# Patient Record
Sex: MALE | Race: OTHER | Employment: FULL TIME | URBAN - METROPOLITAN AREA
[De-identification: names, ages, dates, MRNs, and addresses within clinical notes are randomized per-mention and may not be internally consistent; named-entity substitution may affect disease eponyms.]

---

## 2021-03-24 ENCOUNTER — APPOINTMENT (OUTPATIENT)
Dept: GENERAL RADIOLOGY | Age: 46
End: 2021-03-24
Attending: EMERGENCY MEDICINE
Payer: COMMERCIAL

## 2021-03-24 ENCOUNTER — HOSPITAL ENCOUNTER (EMERGENCY)
Age: 46
Discharge: HOME | End: 2021-03-24
Attending: EMERGENCY MEDICINE
Payer: COMMERCIAL

## 2021-03-24 VITALS
DIASTOLIC BLOOD PRESSURE: 97 MMHG | OXYGEN SATURATION: 99 % | TEMPERATURE: 97.8 F | RESPIRATION RATE: 18 BRPM | HEART RATE: 54 BPM | WEIGHT: 250 LBS | BODY MASS INDEX: 35.79 KG/M2 | HEIGHT: 70 IN | SYSTOLIC BLOOD PRESSURE: 159 MMHG

## 2021-03-24 DIAGNOSIS — R07.89 ATYPICAL CHEST PAIN: ICD-10-CM

## 2021-03-24 DIAGNOSIS — R07.89 MUSCULOSKELETAL CHEST PAIN: Primary | ICD-10-CM

## 2021-03-24 DIAGNOSIS — F43.0 ACUTE REACTION TO STRESS: ICD-10-CM

## 2021-03-24 LAB
ALBUMIN SERPL-MCNC: 4.2 G/DL (ref 3.5–5)
ALBUMIN/GLOB SERPL: 1.2 {RATIO} (ref 1.1–2.2)
ALP SERPL-CCNC: 53 U/L (ref 45–117)
ALT SERPL-CCNC: 29 U/L (ref 12–78)
ANION GAP SERPL CALC-SCNC: 5 MMOL/L (ref 5–15)
AST SERPL-CCNC: 18 U/L (ref 15–37)
BASOPHILS # BLD: 0 K/UL (ref 0–0.1)
BASOPHILS NFR BLD: 0 % (ref 0–1)
BILIRUB SERPL-MCNC: 0.4 MG/DL (ref 0.2–1)
BUN SERPL-MCNC: 10 MG/DL (ref 6–20)
BUN/CREAT SERPL: 11 (ref 12–20)
CALCIUM SERPL-MCNC: 9.5 MG/DL (ref 8.5–10.1)
CHLORIDE SERPL-SCNC: 106 MMOL/L (ref 97–108)
CO2 SERPL-SCNC: 28 MMOL/L (ref 21–32)
COMMENT, HOLDF: NORMAL
CREAT SERPL-MCNC: 0.95 MG/DL (ref 0.7–1.3)
DIFFERENTIAL METHOD BLD: NORMAL
EOSINOPHIL # BLD: 0.1 K/UL (ref 0–0.4)
EOSINOPHIL NFR BLD: 1 % (ref 0–7)
ERYTHROCYTE [DISTWIDTH] IN BLOOD BY AUTOMATED COUNT: 12.4 % (ref 11.5–14.5)
GLOBULIN SER CALC-MCNC: 3.4 G/DL (ref 2–4)
GLUCOSE SERPL-MCNC: 91 MG/DL (ref 65–100)
HCT VFR BLD AUTO: 39.3 % (ref 36.6–50.3)
HGB BLD-MCNC: 13.3 G/DL (ref 12.1–17)
IMM GRANULOCYTES # BLD AUTO: 0 K/UL (ref 0–0.04)
IMM GRANULOCYTES NFR BLD AUTO: 0 % (ref 0–0.5)
LIPASE SERPL-CCNC: 104 U/L (ref 73–393)
LYMPHOCYTES # BLD: 1.7 K/UL (ref 0.8–3.5)
LYMPHOCYTES NFR BLD: 22 % (ref 12–49)
MCH RBC QN AUTO: 29.6 PG (ref 26–34)
MCHC RBC AUTO-ENTMCNC: 33.8 G/DL (ref 30–36.5)
MCV RBC AUTO: 87.5 FL (ref 80–99)
MONOCYTES # BLD: 0.4 K/UL (ref 0–1)
MONOCYTES NFR BLD: 5 % (ref 5–13)
NEUTS SEG # BLD: 5.5 K/UL (ref 1.8–8)
NEUTS SEG NFR BLD: 72 % (ref 32–75)
NRBC # BLD: 0 K/UL (ref 0–0.01)
NRBC BLD-RTO: 0 PER 100 WBC
PLATELET # BLD AUTO: 152 K/UL (ref 150–400)
PMV BLD AUTO: 10.8 FL (ref 8.9–12.9)
POTASSIUM SERPL-SCNC: 3.3 MMOL/L (ref 3.5–5.1)
PROT SERPL-MCNC: 7.6 G/DL (ref 6.4–8.2)
RBC # BLD AUTO: 4.49 M/UL (ref 4.1–5.7)
SAMPLES BEING HELD,HOLD: NORMAL
SODIUM SERPL-SCNC: 139 MMOL/L (ref 136–145)
TROPONIN I SERPL-MCNC: <0.05 NG/ML
WBC # BLD AUTO: 7.7 K/UL (ref 4.1–11.1)

## 2021-03-24 PROCEDURE — 71046 X-RAY EXAM CHEST 2 VIEWS: CPT

## 2021-03-24 PROCEDURE — 80053 COMPREHEN METABOLIC PANEL: CPT

## 2021-03-24 PROCEDURE — 75810000275 HC EMERGENCY DEPT VISIT NO LEVEL OF CARE

## 2021-03-24 PROCEDURE — 83690 ASSAY OF LIPASE: CPT

## 2021-03-24 PROCEDURE — 85025 COMPLETE CBC W/AUTO DIFF WBC: CPT

## 2021-03-24 PROCEDURE — 93005 ELECTROCARDIOGRAM TRACING: CPT

## 2021-03-24 PROCEDURE — 84484 ASSAY OF TROPONIN QUANT: CPT

## 2021-03-24 PROCEDURE — 99284 EMERGENCY DEPT VISIT MOD MDM: CPT

## 2021-03-24 PROCEDURE — 36415 COLL VENOUS BLD VENIPUNCTURE: CPT

## 2021-03-24 RX ORDER — NAPROXEN 500 MG/1
500 TABLET ORAL
Qty: 20 TAB | Refills: 0 | Status: SHIPPED | OUTPATIENT
Start: 2021-03-24 | End: 2021-04-02 | Stop reason: ALTCHOICE

## 2021-03-24 NOTE — LETTER
Sherie Castañeda 54 
[address] [facility] [id number] [phone] Work/School Note Date: 3/24/2021 To Whom It May concern: 
 
Niurka Murrieta was seen and treated today in the emergency room by the following provider(s): 
Attending Provider: Kati Hernández DO. Niurka Murrieta may return to work on 3/29/21. Sincerely, Armond Son,

## 2021-03-25 LAB
ATRIAL RATE: 54 BPM
CALCULATED P AXIS, ECG09: 39 DEGREES
CALCULATED R AXIS, ECG10: -11 DEGREES
CALCULATED T AXIS, ECG11: -22 DEGREES
DIAGNOSIS, 93000: NORMAL
P-R INTERVAL, ECG05: 150 MS
Q-T INTERVAL, ECG07: 446 MS
QRS DURATION, ECG06: 98 MS
QTC CALCULATION (BEZET), ECG08: 422 MS
VENTRICULAR RATE, ECG03: 54 BPM

## 2021-03-25 NOTE — ED PROVIDER NOTES
77-year-old male presents to the emergency department with history of anxiety, Charcot-Donita-Tooth atrophy, gallstones, GERD, HTN, status post cholecystectomy, presents to the emergency department stating that he has not had increased stress at work recently and today was particularly stressful. When he was experiencing stress at work he began experiencing some tightness in the left side of his chest.  He states that he felt like he just needed to hold left side of his chest at times and when he tried to massage this area it seemed like it may have helped but as his symptoms persisted throughout the busy day he was recommended to present to the ED for further evaluation. He states that he has had similar symptoms in the past even when he was much younger when confronted with stressful situations. He denies any history of prior heart disease or family history of early heart disease. He denies any associated shortness of breath, nausea, vomiting, diaphoresis syncope, palpitations. No leg swelling or tenderness, no history of prior DVT/PE. He has not taken anything for symptoms. Past Medical History:   Diagnosis Date    Anxiety     HAD ANXIETY ATTACKS WHEN YOUNGER, HYPERVENTILATED    Arrhythmia     OCCASIONAL PALPITATIONS    Celiac sprue     Charcot Donita Tooth muscular atrophy     MUSCLES FROM KNEE DOWN ARE WEAK; USES HIP AND BACK MUSCLES WHEN WALKING.  Cholelithiasis 4/29/2013    Gallstones     GERD (gastroesophageal reflux disease)     Hypertension     HEADACHES REDUCED SHARPLY SINCE ON BP MEDS.  Hypoglycemia     HAS PASSED OUT FROM, BUT NOT RECENTLY. MORE OFTEN WHEN YOUNGER.  IBS (irritable bowel syndrome)     HERNAN (obstructive sleep apnea)     AHI: 20 per hour    Other ill-defined conditions(799.89)     \"Inverted heart\", palpitations    Snores     FRIEND HAS OBSERVED HIM STOP BREATHING IN HIS SLEEP.     Unspecified adverse effect of anesthesia     NEEDS EXTRA NUMBINB WHEN HAVING DENTAL WORK DONE       Past Surgical History:   Procedure Laterality Date    HX COLONOSCOPY      HX ENDOSCOPY      HX LAP CHOLECYSTECTOMY  5/10/13    By Dr Kiya Mooney    HX WISDOM TEETH EXTRACTION           Family History:   Problem Relation Age of Onset    Hypertension Mother     Hypertension Father     Stroke Father     Heart Disease Father     Bleeding Prob Maternal Grandmother     Arthritis-osteo Maternal Grandmother     Cataract Maternal Grandmother     Bleeding Prob Maternal Grandfather     Arthritis-osteo Maternal Grandfather     Cataract Maternal Grandfather     Cancer Maternal Grandfather     Stroke Paternal Grandfather         CEREBRAL ANEURYSM       Social History     Socioeconomic History    Marital status:      Spouse name: Not on file    Number of children: Not on file    Years of education: Not on file    Highest education level: Not on file   Occupational History    Not on file   Social Needs    Financial resource strain: Not on file    Food insecurity     Worry: Not on file     Inability: Not on file    Transportation needs     Medical: Not on file     Non-medical: Not on file   Tobacco Use    Smoking status: Never Smoker   Substance and Sexual Activity    Alcohol use:  Yes     Alcohol/week: 0.0 standard drinks     Comment: socially    Drug use: No    Sexual activity: Not on file   Lifestyle    Physical activity     Days per week: Not on file     Minutes per session: Not on file    Stress: Not on file   Relationships    Social connections     Talks on phone: Not on file     Gets together: Not on file     Attends Latter day service: Not on file     Active member of club or organization: Not on file     Attends meetings of clubs or organizations: Not on file     Relationship status: Not on file    Intimate partner violence     Fear of current or ex partner: Not on file     Emotionally abused: Not on file     Physically abused: Not on file     Forced sexual activity: Not on file   Other Topics Concern    Not on file   Social History Narrative    Not on file         ALLERGIES: Latex, natural rubber; Other medication; Pcn [penicillins]; Shellfish containing products; and Levofloxacin    Review of Systems   Constitutional: Negative for activity change, appetite change, chills and fever. HENT: Negative for congestion, rhinorrhea, sinus pain, sneezing and sore throat. Eyes: Negative for photophobia and visual disturbance. Respiratory: Positive for chest tightness. Negative for cough and shortness of breath. Cardiovascular: Positive for chest pain. Gastrointestinal: Negative for abdominal pain, blood in stool, constipation, diarrhea, nausea and vomiting. Genitourinary: Negative for difficulty urinating, dysuria, flank pain, hematuria, penile pain and testicular pain. Musculoskeletal: Negative for arthralgias, back pain, myalgias and neck pain. Skin: Negative for rash and wound. Neurological: Negative for syncope, weakness, light-headedness, numbness and headaches. Psychiatric/Behavioral: Negative for self-injury and suicidal ideas. The patient is nervous/anxious. All other systems reviewed and are negative. Vitals:    03/24/21 1853   BP: (!) 159/97   Pulse: (!) 54   Resp: 18   Temp: 97.8 °F (36.6 °C)   SpO2: 99%   Weight: 113.4 kg (250 lb)   Height: 5' 10\" (1.778 m)            Physical Exam  Vitals signs and nursing note reviewed. Constitutional:       General: He is not in acute distress. Appearance: He is well-developed. He is not diaphoretic. Comments: Very pleasant, no acute distress. HENT:      Head: Normocephalic and atraumatic. Nose: Nose normal.   Eyes:      Conjunctiva/sclera: Conjunctivae normal.      Pupils: Pupils are equal, round, and reactive to light. Neck:      Musculoskeletal: Neck supple. Cardiovascular:      Rate and Rhythm: Normal rate and regular rhythm. Heart sounds: Normal heart sounds. Pulmonary:      Effort: Pulmonary effort is normal.      Breath sounds: Normal breath sounds. Chest:      Chest wall: Tenderness (Left chest pectoralis musculature with reproducible tenderness with flexion of his chest wall muscle. No bony crepitus or deformity, no evidence of trauma, no vesicular rash.) present. Abdominal:      General: There is no distension. Palpations: Abdomen is soft. Tenderness: There is no abdominal tenderness. Musculoskeletal:         General: No tenderness. Skin:     General: Skin is warm and dry. Neurological:      Mental Status: He is alert and oriented to person, place, and time. GCS: GCS eye subscore is 4. GCS verbal subscore is 5. GCS motor subscore is 6. Cranial Nerves: No cranial nerve deficit. Sensory: No sensory deficit. Coordination: Coordination normal.          MDM   Well-appearing 48-year-old male presents stating that he had a stressful day and had intermittent chest tightness during. Now asymptomatic while at rest but reproducible chest pain on exam.  Is afebrile and vital signs stable in no acute distress. Labs returned reassuringly showing no significant abnormalities. CXR viewed by myself and read by radiology showing no acute abnormalities. Reassurance was given feel that symptoms are likely secondary to MSK chest tightness and anxiety with low suspicion for ACS, PE, dissection, or any other intrathoracic emergency at this time. Will Rx Naprosyn for symptomatic relief and recommended stretching and stress reduction techniques. This plan was discussed with the patient and significant other at the bedside and he stated both understanding and agreement. Procedures  2012 EKG shows sinus bradycardia with a rate of 54 bpm with T wave inversion inferiorly but no ST elevation or depression.

## 2021-04-02 ENCOUNTER — OFFICE VISIT (OUTPATIENT)
Dept: CARDIOLOGY CLINIC | Age: 46
End: 2021-04-02
Payer: COMMERCIAL

## 2021-04-02 VITALS
BODY MASS INDEX: 34.65 KG/M2 | DIASTOLIC BLOOD PRESSURE: 80 MMHG | HEIGHT: 70 IN | SYSTOLIC BLOOD PRESSURE: 130 MMHG | OXYGEN SATURATION: 98 % | HEART RATE: 60 BPM | WEIGHT: 242 LBS | RESPIRATION RATE: 13 BRPM

## 2021-04-02 DIAGNOSIS — I10 ESSENTIAL HYPERTENSION: ICD-10-CM

## 2021-04-02 DIAGNOSIS — E78.2 MIXED HYPERLIPIDEMIA: ICD-10-CM

## 2021-04-02 DIAGNOSIS — R07.89 OTHER CHEST PAIN: Primary | ICD-10-CM

## 2021-04-02 DIAGNOSIS — R00.2 PALPITATIONS: ICD-10-CM

## 2021-04-02 PROCEDURE — 99244 OFF/OP CNSLTJ NEW/EST MOD 40: CPT | Performed by: SPECIALIST

## 2021-04-02 RX ORDER — BUSPIRONE HYDROCHLORIDE 10 MG/1
10 TABLET ORAL AS NEEDED
COMMUNITY

## 2021-04-02 RX ORDER — PRAVASTATIN SODIUM 40 MG/1
40 TABLET ORAL
COMMUNITY

## 2021-04-02 RX ORDER — LISINOPRIL 10 MG/1
10 TABLET ORAL DAILY
COMMUNITY

## 2021-04-02 RX ORDER — BISMUTH SUBSALICYLATE 262 MG
1 TABLET,CHEWABLE ORAL DAILY
COMMUNITY

## 2021-04-02 RX ORDER — BUPROPION HYDROCHLORIDE 150 MG/1
1 TABLET ORAL DAILY
COMMUNITY
Start: 2021-03-25

## 2021-04-02 NOTE — PROGRESS NOTES
HISTORY OF PRESENT ILLNESS  Ike Hwang is a 54 y.o. male     SUMMARY:   Problem List  Date Reviewed: 4/2/2021          Codes Class Noted    Bradycardia ICD-10-CM: R00.1  ICD-9-CM: 427.89  5/16/2013    Overview Signed 5/16/2013  5:38 PM by Jose Alejandro Mann MD     5/13 echo normal lvef mild tr without pul htn             Abnormal EKG ICD-10-CM: R94.31  ICD-9-CM: 794.31  5/8/2013        Preoperative cardiovascular examination ICD-10-CM: Z01.810  ICD-9-CM: V72.81  5/8/2013        HTN (hypertension) ICD-10-CM: I10  ICD-9-CM: 401.9  5/8/2013        Palpitations ICD-10-CM: R00.2  ICD-9-CM: 785.1  5/8/2013        SOB (shortness of breath) ICD-10-CM: R06.02  ICD-9-CM: 786.05  5/8/2013        Chest pain, unspecified ICD-10-CM: R07.9  ICD-9-CM: 786.50  5/8/2013        Cholelithiasis ICD-10-CM: K80.20  ICD-9-CM: 574.20  4/29/2013              Current Outpatient Medications on File Prior to Visit   Medication Sig    lisinopriL (PRINIVIL, ZESTRIL) 10 mg tablet Take 10 mg by mouth daily.  buPROPion XL (WELLBUTRIN XL) 150 mg tablet Take 1 Tab by mouth daily.  busPIRone (BUSPAR) 10 mg tablet Take 10 mg by mouth as needed.  pravastatin (PRAVACHOL) 40 mg tablet Take 40 mg by mouth nightly.  multivitamin (ONE A DAY) tablet Take 1 Tab by mouth daily.  FLUoxetine (PROZAC) 20 mg capsule Take 40 mg by mouth daily.  omeprazole (PRILOSEC) 20 mg capsule Take 20 mg by mouth daily.  ranitidine (ZANTAC) 150 mg tablet     cloNIDine (CATAPRES) 0.1 mg tablet Take 0.1 mg by mouth daily. TAKES DAILY AFTER 2PM.    fexofenadine (ALLEGRA) 180 mg tablet Take  by mouth daily.  chlorthalidone (HYGROTEN) 25 mg tablet Take 25 mg by mouth daily. No current facility-administered medications on file prior to visit.         CARDIOLOGY STUDIES TO DATE:  5/13 normal echo  6/13 normal stress echo    Chief Complaint   Patient presents with    New Patient     HPI :  He was referred from the emergency room for cardiac evaluation. He has a long history of atypical type chest pain with a negative work-up in 2013 which I reviewed and summarized above. 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 He was recently at work and began to have some chest pressure which persisted for several hours. Because of all this he was advised to go to urgent care and they immediately sent him to the emergency department where he had a negative work-up. His EKG showed sinus rhythm left ventricular hypertrophy and inferior T wave inversion. It is identical to his tracing from 2013. He has hypertension and hyperlipidemia. There is no history of diabetes he is never smoked and family history is negative for premature coronary disease. He is unable to really exercise because of degenerative muscle disease involves his lower legs. He continues have occasional palpitations  CARDIAC ROS:   negative for dyspnea, syncope, orthopnea, paroxysmal nocturnal dyspnea, exertional chest pressure/discomfort, claudication, lower extremity edema    Family History   Problem Relation Age of Onset    Hypertension Mother     Hypertension Father     Stroke Father     Heart Disease Father     Bleeding Prob Maternal Grandmother     Arthritis-osteo Maternal Grandmother     Cataract Maternal Grandmother     Bleeding Prob Maternal Grandfather     Arthritis-osteo Maternal Grandfather     Cataract Maternal Grandfather     Cancer Maternal Grandfather     Stroke Paternal Grandfather         CEREBRAL ANEURYSM       Past Medical History:   Diagnosis Date    Anxiety     HAD ANXIETY ATTACKS WHEN YOUNGER, HYPERVENTILATED    Arrhythmia     OCCASIONAL PALPITATIONS    Celiac sprue     Charcot Donita Tooth muscular atrophy     MUSCLES FROM KNEE DOWN ARE WEAK; USES HIP AND BACK MUSCLES WHEN WALKING.     Cholelithiasis 4/29/2013    Gallstones     GERD (gastroesophageal reflux disease)     Hypertension     HEADACHES REDUCED SHARPLY SINCE ON BP MEDS.  Hypoglycemia     HAS PASSED OUT FROM, BUT NOT RECENTLY. MORE OFTEN WHEN YOUNGER.  IBS (irritable bowel syndrome)     HERNAN (obstructive sleep apnea)     AHI: 20 per hour    Other ill-defined conditions(799.89)     \"Inverted heart\", palpitations    Snores     FRIEND HAS OBSERVED HIM STOP BREATHING IN HIS SLEEP.  Unspecified adverse effect of anesthesia     NEEDS EXTRA NUMBINB WHEN HAVING DENTAL WORK DONE       GENERAL ROS:  A comprehensive review of systems was negative except for that written in the HPI. Visit Vitals  /80 (BP 1 Location: Right arm, BP Patient Position: Sitting, BP Cuff Size: Adult)   Pulse 60   Resp 13   Ht 5' 10\" (1.778 m)   Wt 242 lb (109.8 kg)   SpO2 98%   BMI 34.72 kg/m²       Wt Readings from Last 3 Encounters:   04/02/21 242 lb (109.8 kg)   03/24/21 250 lb (113.4 kg)   07/28/15 248 lb (112.5 kg)            BP Readings from Last 3 Encounters:   04/02/21 130/80   03/24/21 (!) 159/97   07/28/15 122/86       PHYSICAL EXAM  General appearance: alert, cooperative, no distress, appears stated age  Neurologic: Alert and oriented X 3  Neck: supple, symmetrical, trachea midline, no adenopathy, no carotid bruit and no JVD  Lungs: clear to auscultation bilaterally  Heart: regular rate and rhythm, S1, S2 normal, no murmur, click, rub or gallop  Abdomen: soft, non-tender. Bowel sounds normal. No masses,  no organomegaly  Extremities: extremities normal, atraumatic, no cyanosis or edema  Pulses: 2+ and symmetric      ASSESSMENT :      His symptoms are very atypical and given the prolonged spell recently, similar to previous spells only though more prolonged, with a negative work-up I think it is highly unlikely that his symptoms represent underlying coronary disease. I reassured him that that regard. I do think anxiety and stress may be part of it so he is going to work on that with his primary care.   We talked about symptoms that would prompt an urgent return visit here or a call to 911, but at this point he does not need any specific cardiac testing. current treatment plan is effective, no change in therapy  lab results and schedule of future lab studies reviewed with patient  reviewed diet, exercise and weight control    Encounter Diagnoses   Name Primary?  Other chest pain Yes    Essential hypertension      Orders Placed This Encounter    lisinopriL (PRINIVIL, ZESTRIL) 10 mg tablet    buPROPion XL (WELLBUTRIN XL) 150 mg tablet    busPIRone (BUSPAR) 10 mg tablet    pravastatin (PRAVACHOL) 40 mg tablet    multivitamin (ONE A DAY) tablet       Follow-up and Dispositions    · Return if symptoms worsen or fail to improve. Bishop Vivian MD  4/2/2021  Please note that this dictation was completed with beqom, the [employer] voice recognition software. Quite often unanticipated grammatical, syntax, homophones, and other interpretive errors are inadvertently transcribed by the computer software. Please disregard these errors. Please excuse any errors that have escaped final proofreading. Thank you.

## 2023-05-11 RX ORDER — PRAVASTATIN SODIUM 40 MG
40 TABLET ORAL NIGHTLY
COMMUNITY

## 2023-05-11 RX ORDER — FLUOXETINE HYDROCHLORIDE 20 MG/1
40 CAPSULE ORAL DAILY
COMMUNITY
Start: 2015-04-02

## 2023-05-11 RX ORDER — BUPROPION HYDROCHLORIDE 150 MG/1
1 TABLET ORAL DAILY
COMMUNITY
Start: 2021-03-25

## 2023-05-11 RX ORDER — RANITIDINE 150 MG/1
TABLET ORAL
COMMUNITY
Start: 2015-04-02

## 2023-05-11 RX ORDER — CHLORTHALIDONE 25 MG/1
25 TABLET ORAL DAILY
COMMUNITY

## 2023-05-11 RX ORDER — FEXOFENADINE HCL 180 MG/1
TABLET ORAL DAILY
COMMUNITY

## 2023-05-11 RX ORDER — OMEPRAZOLE 20 MG/1
20 CAPSULE, DELAYED RELEASE ORAL DAILY
COMMUNITY
Start: 2015-05-05

## 2023-05-11 RX ORDER — CLONIDINE HYDROCHLORIDE 0.1 MG/1
TABLET ORAL DAILY
COMMUNITY

## 2023-05-11 RX ORDER — BUSPIRONE HYDROCHLORIDE 10 MG/1
TABLET ORAL PRN
COMMUNITY

## 2023-05-11 RX ORDER — LISINOPRIL 10 MG/1
10 TABLET ORAL DAILY
COMMUNITY

## 2024-01-26 ENCOUNTER — OFFICE VISIT (OUTPATIENT)
Age: 49
End: 2024-01-26
Payer: COMMERCIAL

## 2024-01-26 VITALS
HEART RATE: 58 BPM | DIASTOLIC BLOOD PRESSURE: 76 MMHG | RESPIRATION RATE: 16 BRPM | BODY MASS INDEX: 35.07 KG/M2 | HEIGHT: 70 IN | SYSTOLIC BLOOD PRESSURE: 104 MMHG | WEIGHT: 245 LBS | OXYGEN SATURATION: 98 %

## 2024-01-26 DIAGNOSIS — G62.9 NEUROPATHY: ICD-10-CM

## 2024-01-26 DIAGNOSIS — R26.9 GAIT DIFFICULTY: ICD-10-CM

## 2024-01-26 DIAGNOSIS — G60.0 CMT (CHARCOT-MARIE-TOOTH DISEASE): Primary | ICD-10-CM

## 2024-01-26 PROCEDURE — 3078F DIAST BP <80 MM HG: CPT | Performed by: PSYCHIATRY & NEUROLOGY

## 2024-01-26 PROCEDURE — 3074F SYST BP LT 130 MM HG: CPT | Performed by: PSYCHIATRY & NEUROLOGY

## 2024-01-26 PROCEDURE — 99205 OFFICE O/P NEW HI 60 MIN: CPT | Performed by: PSYCHIATRY & NEUROLOGY

## 2024-01-26 RX ORDER — LOSARTAN POTASSIUM 100 MG/1
100 TABLET ORAL DAILY
COMMUNITY
Start: 2023-11-26

## 2024-01-26 ASSESSMENT — PATIENT HEALTH QUESTIONNAIRE - PHQ9
SUM OF ALL RESPONSES TO PHQ QUESTIONS 1-9: 0
1. LITTLE INTEREST OR PLEASURE IN DOING THINGS: 0
2. FEELING DOWN, DEPRESSED OR HOPELESS: 0
SUM OF ALL RESPONSES TO PHQ9 QUESTIONS 1 & 2: 0

## 2024-01-26 NOTE — PROGRESS NOTES
1. Have you been to the ER, urgent care clinic since your last visit?  Hospitalized since your last visit?  No.    2. Have you seen or consulted any other health care providers outside of the [facility] since your last visit?  Include any pap smears or colon screening.   No.        Chief Complaint   Patient presents with    New Patient     CMT- falling more recently, feet turning purple, numb from knee down        
gait    Labs:     Lab Results   Component Value Date/Time     03/24/2021 08:18 PM    K 3.3 03/24/2021 08:18 PM     03/24/2021 08:18 PM    BUN 10 03/24/2021 08:18 PM    WBC 7.7 03/24/2021 08:18 PM    HCT 39.3 03/24/2021 08:18 PM    HGB 13.3 03/24/2021 08:18 PM     03/24/2021 08:18 PM                  Patient Active Problem List   Diagnosis    Chest pain, unspecified    SOB (shortness of breath)    Cholelithiasis    Preoperative cardiovascular examination    HTN (hypertension)    Bradycardia    Abnormal EKG    Palpitations               The patient should return to clinic in 6 months to a year    Renewed medication: None    I spent  65  minutes on the day of the encounter preparing the office visit by reviewing medical records, obtaining a history, performing examination, counseling and educating the patient on diagnosis, ordering tests, completing documentation for his employer, documenting in the clinical medical record, and coordinating the care for the patient.  The patient had the ability to ask questions and all questions were answered.        Signed By:  Dwight Hernández DO FAAN    January 26, 2024

## 2024-01-29 ENCOUNTER — CLINICAL DOCUMENTATION (OUTPATIENT)
Age: 49
End: 2024-01-29

## 2024-01-29 NOTE — PROGRESS NOTES
Faxed last office note, orthotic Rx to Attn:Elder Neal at [phone] and received fax confirmation.

## 2024-02-21 ENCOUNTER — CLINICAL DOCUMENTATION (OUTPATIENT)
Age: 49
End: 2024-02-21

## 2024-02-21 NOTE — PROGRESS NOTES
Pt came into office-verified with two pt identifiers for genetic testing. Explained test to pt. Pt then performed test-spit into tube. Tube labeled and placed in bag and placed in box. Paperwork and insurance confirmed with pt. Placed test and paperwork in [employer] bag. Will take to [employer] after work today.

## 2024-03-08 ENCOUNTER — TELEPHONE (OUTPATIENT)
Age: 49
End: 2024-03-08

## 2025-01-14 ENCOUNTER — CLINICAL DOCUMENTATION (OUTPATIENT)
Age: 50
End: 2025-01-14

## 2025-01-14 NOTE — PROGRESS NOTES
Faxed signed and reviewed  Detailed written order and letter of medical necessity to Elder Neal office at [phone] and received fax confirmation.   Placed copy in fast scan.